# Patient Record
Sex: FEMALE | Race: WHITE | NOT HISPANIC OR LATINO | Employment: OTHER | URBAN - METROPOLITAN AREA
[De-identification: names, ages, dates, MRNs, and addresses within clinical notes are randomized per-mention and may not be internally consistent; named-entity substitution may affect disease eponyms.]

---

## 2023-09-08 PROBLEM — M22.42 PATELLOFEMORAL CHONDROSIS OF LEFT KNEE: Status: ACTIVE | Noted: 2023-09-08

## 2023-09-08 PROBLEM — R03.0 BORDERLINE BLOOD PRESSURE: Status: ACTIVE | Noted: 2023-09-08

## 2023-09-08 PROBLEM — M17.12 ARTHRITIS OF KNEE, LEFT: Status: ACTIVE | Noted: 2023-09-08

## 2023-09-08 PROBLEM — L98.9 SKIN LESION: Status: ACTIVE | Noted: 2023-09-08

## 2023-09-08 PROBLEM — G95.20 CERVICAL SPINAL CORD COMPRESSION (MULTI): Status: ACTIVE | Noted: 2023-09-08

## 2023-09-08 PROBLEM — G25.81 RESTLESS LEG SYNDROME: Status: ACTIVE | Noted: 2023-09-08

## 2023-09-08 PROBLEM — D64.9 ANEMIA: Status: ACTIVE | Noted: 2023-09-08

## 2023-09-08 PROBLEM — I78.1 SPIDER VEINS: Status: ACTIVE | Noted: 2023-09-08

## 2023-09-08 PROBLEM — E78.5 HYPERLIPIDEMIA: Status: ACTIVE | Noted: 2023-09-08

## 2023-09-08 PROBLEM — M47.812 CERVICAL SPONDYLOSIS WITHOUT MYELOPATHY: Status: ACTIVE | Noted: 2023-09-08

## 2023-09-08 PROBLEM — M54.12 CERVICAL RADICULOPATHY: Status: ACTIVE | Noted: 2023-09-08

## 2023-09-08 RX ORDER — ACETAMINOPHEN 500 MG
1 TABLET ORAL DAILY
COMMUNITY
End: 2024-02-09 | Stop reason: ALTCHOICE

## 2023-09-08 RX ORDER — CODEINE SULFATE 60 MG/1
TABLET ORAL
COMMUNITY
End: 2024-02-09 | Stop reason: ALTCHOICE

## 2023-09-08 RX ORDER — MULTIVIT-MIN/IRON/FOLIC ACID/K 18-600-40
1 CAPSULE ORAL
COMMUNITY
End: 2024-02-09 | Stop reason: ALTCHOICE

## 2023-09-08 RX ORDER — LORATADINE AND PSEUDOEPHEDRINE SULFATE 5; 120 MG/1; MG/1
1 TABLET, EXTENDED RELEASE ORAL 2 TIMES DAILY PRN
COMMUNITY

## 2023-09-08 RX ORDER — AMITRIPTYLINE HYDROCHLORIDE 10 MG/1
10 TABLET, FILM COATED ORAL
COMMUNITY
End: 2024-02-09 | Stop reason: ALTCHOICE

## 2023-09-08 RX ORDER — ASPIRIN 325 MG
TABLET, DELAYED RELEASE (ENTERIC COATED) ORAL
COMMUNITY
End: 2024-02-09 | Stop reason: ALTCHOICE

## 2023-09-08 RX ORDER — DOXYCYCLINE 100 MG/1
100 CAPSULE ORAL 2 TIMES DAILY
COMMUNITY
Start: 2022-10-19 | End: 2024-02-09 | Stop reason: ALTCHOICE

## 2024-02-07 RX ORDER — TIZANIDINE 4 MG/1
4 TABLET ORAL NIGHTLY PRN
COMMUNITY
Start: 2024-01-29

## 2024-02-07 RX ORDER — VERAPAMIL HYDROCHLORIDE 120 MG/1
120 TABLET, FILM COATED, EXTENDED RELEASE ORAL DAILY
COMMUNITY
Start: 2024-01-07

## 2024-02-09 ENCOUNTER — TELEPHONE (OUTPATIENT)
Dept: PRIMARY CARE | Facility: CLINIC | Age: 59
End: 2024-02-09

## 2024-02-09 ENCOUNTER — OFFICE VISIT (OUTPATIENT)
Dept: PRIMARY CARE | Facility: CLINIC | Age: 59
End: 2024-02-09
Payer: COMMERCIAL

## 2024-02-09 ENCOUNTER — LAB (OUTPATIENT)
Dept: LAB | Facility: LAB | Age: 59
End: 2024-02-09
Payer: COMMERCIAL

## 2024-02-09 VITALS
HEART RATE: 61 BPM | HEIGHT: 67 IN | BODY MASS INDEX: 26.37 KG/M2 | WEIGHT: 168 LBS | DIASTOLIC BLOOD PRESSURE: 84 MMHG | SYSTOLIC BLOOD PRESSURE: 140 MMHG | OXYGEN SATURATION: 98 % | TEMPERATURE: 98.1 F

## 2024-02-09 DIAGNOSIS — Z00.00 ENCOUNTER FOR GENERAL ADULT MEDICAL EXAMINATION WITHOUT ABNORMAL FINDINGS: Primary | ICD-10-CM

## 2024-02-09 DIAGNOSIS — Z00.00 ANNUAL PHYSICAL EXAM: Primary | ICD-10-CM

## 2024-02-09 DIAGNOSIS — M54.12 CERVICAL RADICULOPATHY: ICD-10-CM

## 2024-02-09 DIAGNOSIS — I83.93 VARICOSE VEINS OF BOTH LOWER EXTREMITIES, UNSPECIFIED WHETHER COMPLICATED: ICD-10-CM

## 2024-02-09 DIAGNOSIS — Z12.31 ENCOUNTER FOR SCREENING MAMMOGRAM FOR MALIGNANT NEOPLASM OF BREAST: ICD-10-CM

## 2024-02-09 DIAGNOSIS — E78.5 HYPERLIPIDEMIA, UNSPECIFIED: ICD-10-CM

## 2024-02-09 DIAGNOSIS — M53.3 SACROILIAC PAIN: ICD-10-CM

## 2024-02-09 DIAGNOSIS — E78.2 MIXED HYPERLIPIDEMIA: ICD-10-CM

## 2024-02-09 PROBLEM — M54.2 NECK PAIN: Status: RESOLVED | Noted: 2022-03-15 | Resolved: 2024-02-09

## 2024-02-09 PROBLEM — R51.9 HEADACHE: Status: RESOLVED | Noted: 2024-02-09 | Resolved: 2024-02-09

## 2024-02-09 PROBLEM — R50.9 FEVER: Status: RESOLVED | Noted: 2024-02-09 | Resolved: 2024-02-09

## 2024-02-09 PROBLEM — I78.1 SPIDER ANGIOMA: Status: RESOLVED | Noted: 2024-02-09 | Resolved: 2024-02-09

## 2024-02-09 PROBLEM — G25.81 RESTLESS LEGS SYNDROME: Status: RESOLVED | Noted: 2024-02-09 | Resolved: 2024-02-09

## 2024-02-09 LAB
ALBUMIN SERPL BCP-MCNC: 4.5 G/DL (ref 3.4–5)
ALP SERPL-CCNC: 74 U/L (ref 33–136)
ALT SERPL W P-5'-P-CCNC: 27 U/L (ref 7–45)
ANION GAP SERPL CALC-SCNC: 12 MMOL/L (ref 10–20)
AST SERPL W P-5'-P-CCNC: 25 U/L (ref 9–39)
BILIRUB SERPL-MCNC: 0.5 MG/DL (ref 0–1.2)
BUN SERPL-MCNC: 19 MG/DL (ref 6–23)
CALCIUM SERPL-MCNC: 10 MG/DL (ref 8.6–10.6)
CHLORIDE SERPL-SCNC: 104 MMOL/L (ref 98–107)
CHOLEST SERPL-MCNC: 203 MG/DL (ref 0–199)
CHOLESTEROL/HDL RATIO: 2.4
CO2 SERPL-SCNC: 33 MMOL/L (ref 21–32)
CREAT SERPL-MCNC: 0.86 MG/DL (ref 0.5–1.05)
EGFRCR SERPLBLD CKD-EPI 2021: 77 ML/MIN/1.73M*2
ERYTHROCYTE [DISTWIDTH] IN BLOOD BY AUTOMATED COUNT: 11.7 % (ref 11.5–14.5)
GLUCOSE SERPL-MCNC: 84 MG/DL (ref 74–99)
HCT VFR BLD AUTO: 42.3 % (ref 36–46)
HDLC SERPL-MCNC: 84.1 MG/DL
HGB BLD-MCNC: 14 G/DL (ref 12–16)
LDLC SERPL CALC-MCNC: 107 MG/DL
MCH RBC QN AUTO: 29 PG (ref 26–34)
MCHC RBC AUTO-ENTMCNC: 33.1 G/DL (ref 32–36)
MCV RBC AUTO: 88 FL (ref 80–100)
NON HDL CHOLESTEROL: 119 MG/DL (ref 0–149)
NRBC BLD-RTO: 0 /100 WBCS (ref 0–0)
PLATELET # BLD AUTO: 251 X10*3/UL (ref 150–450)
POTASSIUM SERPL-SCNC: 4.7 MMOL/L (ref 3.5–5.3)
PROT SERPL-MCNC: 7.1 G/DL (ref 6.4–8.2)
RBC # BLD AUTO: 4.83 X10*6/UL (ref 4–5.2)
SODIUM SERPL-SCNC: 144 MMOL/L (ref 136–145)
TRIGL SERPL-MCNC: 59 MG/DL (ref 0–149)
VLDL: 12 MG/DL (ref 0–40)
WBC # BLD AUTO: 5 X10*3/UL (ref 4.4–11.3)

## 2024-02-09 PROCEDURE — 99396 PREV VISIT EST AGE 40-64: CPT | Performed by: INTERNAL MEDICINE

## 2024-02-09 PROCEDURE — 80053 COMPREHEN METABOLIC PANEL: CPT

## 2024-02-09 PROCEDURE — 85027 COMPLETE CBC AUTOMATED: CPT

## 2024-02-09 PROCEDURE — 1036F TOBACCO NON-USER: CPT | Performed by: INTERNAL MEDICINE

## 2024-02-09 PROCEDURE — 36415 COLL VENOUS BLD VENIPUNCTURE: CPT

## 2024-02-09 PROCEDURE — 80061 LIPID PANEL: CPT

## 2024-02-09 RX ORDER — LANOLIN ALCOHOL/MO/W.PET/CERES
1000 CREAM (GRAM) TOPICAL DAILY
COMMUNITY

## 2024-02-09 RX ORDER — IBUPROFEN 100 MG/5ML
1000 SUSPENSION, ORAL (FINAL DOSE FORM) ORAL DAILY
COMMUNITY

## 2024-02-09 RX ORDER — BISMUTH SUBSALICYLATE 262 MG
1 TABLET,CHEWABLE ORAL DAILY
COMMUNITY

## 2024-02-09 ASSESSMENT — PAIN SCALES - GENERAL: PAINLEVEL: 0-NO PAIN

## 2024-02-09 ASSESSMENT — LIFESTYLE VARIABLES
HAS A RELATIVE, FRIEND, DOCTOR, OR ANOTHER HEALTH PROFESSIONAL EXPRESSED CONCERN ABOUT YOUR DRINKING OR SUGGESTED YOU CUT DOWN: NO
SKIP TO QUESTIONS 9-10: 1
HOW OFTEN DURING THE LAST YEAR HAVE YOU NEEDED AN ALCOHOLIC DRINK FIRST THING IN THE MORNING TO GET YOURSELF GOING AFTER A NIGHT OF HEAVY DRINKING: NEVER
HOW OFTEN DURING THE LAST YEAR HAVE YOU BEEN UNABLE TO REMEMBER WHAT HAPPENED THE NIGHT BEFORE BECAUSE YOU HAD BEEN DRINKING: NEVER
HOW OFTEN DURING THE LAST YEAR HAVE YOU FAILED TO DO WHAT WAS NORMALLY EXPECTED FROM YOU BECAUSE OF DRINKING: NEVER
HOW MANY STANDARD DRINKS CONTAINING ALCOHOL DO YOU HAVE ON A TYPICAL DAY: PATIENT DOES NOT DRINK
AUDIT TOTAL SCORE: 0
HOW OFTEN DURING THE LAST YEAR HAVE YOU HAD A FEELING OF GUILT OR REMORSE AFTER DRINKING: NEVER
HOW OFTEN DO YOU HAVE A DRINK CONTAINING ALCOHOL: NEVER
HAVE YOU OR SOMEONE ELSE BEEN INJURED AS A RESULT OF YOUR DRINKING: NO
HOW OFTEN DURING THE LAST YEAR HAVE YOU FOUND THAT YOU WERE NOT ABLE TO STOP DRINKING ONCE YOU HAD STARTED: NEVER
HOW OFTEN DO YOU HAVE SIX OR MORE DRINKS ON ONE OCCASION: NEVER
AUDIT-C TOTAL SCORE: 0

## 2024-02-09 ASSESSMENT — ENCOUNTER SYMPTOMS
OCCASIONAL FEELINGS OF UNSTEADINESS: 0
DEPRESSION: 0
LOSS OF SENSATION IN FEET: 0

## 2024-02-09 ASSESSMENT — PATIENT HEALTH QUESTIONNAIRE - PHQ9
SUM OF ALL RESPONSES TO PHQ9 QUESTIONS 1 AND 2: 0
2. FEELING DOWN, DEPRESSED OR HOPELESS: NOT AT ALL
1. LITTLE INTEREST OR PLEASURE IN DOING THINGS: NOT AT ALL

## 2024-02-09 NOTE — TELEPHONE ENCOUNTER
----- Message from Julius Branch MD sent at 2/9/2024  3:22 PM EST -----  Labs are stable except mild elevation of cholesterol.watch fat in diet

## 2024-02-09 NOTE — PROGRESS NOTES
[facility]: [facility]  PROGRESS NOTE      Yahaira Berrios is a 58 y.o. female that is being seen  today for Annual Exam.  Subjective   Pt. Is being seen for annual physical exam.Pt. has been doing well.Advance directives discussed with patient   Denies any hospitalisation or urgent care visit.  Previous Labs reviewed .  Pt. Is upto date on screening exams and vaccination  Denies any falls or hospitalisation.     Patient is a 58-year-old female who is being seen for annual physical examination.  Patient has hypertension and hyperlipidemia.  Patient also has history of varicose veins and sacroiliac pain.  Denies any significant complaints at present.  Patient is up-to-date on screening colonoscopy.  Patient will be due for screening mammogram.      ROS  Negative for fever or chills  Negative for sore throat, ear pain, nasal discharge  Negative for cough, shortness of breath or wheezing  Negative for chest pain, palpitations, swelling of legs  Negative for abdominal pain, constipation, diarrhea, blood in the stools  Negative for urinary complaints  Negative for headache, dizziness, weakness or numbness  Positive for sacroiliac pain  Negative for depression or anxiety  All other systems reviewed and were negative   Vitals:    02/09/24 1132   BP: 140/84   Pulse: 61   Temp: 36.7 °C (98.1 °F)   SpO2: 98%      Vitals:    02/09/24 1132   Weight: 76.2 kg (168 lb)     Body mass index is 26.31 kg/m².  Physical Exam  Constitutional: Patient does not appear to be in any acute distress  Head and Face: NCAT  Eyes: Normal external exam, EOMI  ENT: Normal external inspection of ears and nose. Oropharynx normal.  Cardiovascular: RRR, S1/S2, no murmurs, rubs, or gallops, radial pulses +2, no edema of extremities  Pulmonary: CTAB, no respiratory distress.  Abdomen: +BS, soft, non-tender, nondistended, no guarding or rebound, no masses noted  MSK: No joint swelling, normal movements of all extremities. Range of  "motion- normal.  Mild tenderness over the sacroiliac joint  Skin- No lesions, contusions, or erythema.  Varicose veins present bilaterally  Peripheral puslses palpable bilaterally 2+  Neuro: AAO X3, Cranial nerves 2-12 grossly intact,DTR 2+ in all 4 limbs   Psychiatric: Judgment intact. Appropriate mood and behavior    LABS   [unfilled]  Lab Results   Component Value Date    GLUCOSE 90 01/27/2023    CALCIUM 10.2 01/27/2023     01/27/2023    K 4.9 01/27/2023    CO2 26 01/27/2023     01/27/2023    BUN 23 01/27/2023    CREATININE 1.0 01/27/2023     Lab Results   Component Value Date    ALT 22 01/27/2023    AST 22 01/27/2023    ALKPHOS 92 01/27/2023    BILITOT 0.4 01/27/2023     Lab Results   Component Value Date    WBC 5.1 01/27/2023    HGB 14.5 01/27/2023    HCT 44.1 (H) 01/27/2023    MCV 87.0 01/27/2023     01/27/2023     Lab Results   Component Value Date    CHOL 208 (H) 01/27/2023    CHOL 214 (H) 07/23/2021     Lab Results   Component Value Date    HDL 81 01/27/2023    HDL 82 07/23/2021     Lab Results   Component Value Date    LDLCALC 108 01/27/2023    LDLCALC 107 07/23/2021     Lab Results   Component Value Date    TRIG 93 01/27/2023    TRIG 124 07/23/2021     No results found for: \"HGBA1C\"  Other labs not included in the list above were reviewed either before or during this encounter.    History    Past Medical History:   Diagnosis Date    Anemia     Borderline blood pressure     Fever 02/09/2024    Headache 02/09/2024    Hyperlipemia     Neck pain 03/15/2022    Personal history of other diseases of the musculoskeletal system and connective tissue     History of neck pain    Restless leg syndrome     Restless legs syndrome 02/09/2024    Skin lesion     Spider angioma 02/09/2024    Spider veins      Past Surgical History:   Procedure Laterality Date    OTHER SURGICAL HISTORY  04/04/2022    Tonsillectomy    OTHER SURGICAL HISTORY  04/04/2022    Appendectomy     Family History   Problem Relation " Name Age of Onset    Hyperlipidemia Father      Hypertension Father      Other (parkinsons) Father       Allergies   Allergen Reactions    Tramadol Anaphylaxis    Codeine Other     Upset stomach, vomiting    Sulfamethoxazole-Trimethoprim Rash     Current Outpatient Medications on File Prior to Visit   Medication Sig Dispense Refill    ascorbic acid (Vitamin C) 1,000 mg tablet Take 1 tablet (1,000 mg) by mouth once daily.      cyanocobalamin (Vitamin B-12) 1,000 mcg tablet Take 1 tablet (1,000 mcg) by mouth once daily.      loratadine-pseudoephedrine (Claritin-D 12 Hour) 5-120 mg 12 hr tablet Take 1 tablet by mouth 2 times a day as needed.      MAGNESIUM ORAL Take by mouth. 2 gels at night      multivitamin tablet Take 1 tablet by mouth once daily.      mv,jac,iron,mn/folic acid/chol (HAIR-SKIN-NAILS, PABA, ORAL) Take 2 Pieces of gum by mouth once daily.      tiZANidine (Zanaflex) 4 mg tablet Take 1 tablet (4 mg) by mouth as needed at bedtime for muscle spasms.      UNABLE TO FIND BEET ROOT; 8000 MG      verapamil SR (Calan-SR) 120 mg ER tablet Take 1 tablet (120 mg) by mouth once daily.      vitamin E acetate (VITAMIN E ORAL) Take 180 mg by mouth once daily.      [DISCONTINUED] amitriptyline (Elavil) 10 mg tablet Take 1 tablet (10 mg) by mouth.      [DISCONTINUED] ascorbic acid, vitamin C, 500 mg capsule Take 1 capsule by mouth.      [DISCONTINUED] biotin 300 mcg tablet Take 1 tablet (300 mcg) by mouth.      [DISCONTINUED] cholecalciferol (Vitamin D-3) 1,250 mcg (50,000 unit) capsule Take by mouth.      [DISCONTINUED] cholecalciferol (Vitamin D3) 5,000 Units tablet Take 1 capsule by mouth once daily.      [DISCONTINUED] codeine 60 mg tablet Take by mouth.      [DISCONTINUED] doxycycline (Monodox) 100 mg capsule Take 1 capsule (100 mg) by mouth 2 times a day.      [DISCONTINUED] fish oil/borage/flax/om3,6,9 1 (OMEGA 3-6-9 ORAL) Take by mouth.      [DISCONTINUED] MULTIVITAMINS WITH FLUORIDE ORAL Take by mouth.       [DISCONTINUED] vit B complex-C-folic ac-zinc 800 mcg- 12.5 mg tablet Take by mouth.       No current facility-administered medications on file prior to visit.     Immunization History   Administered Date(s) Administered    Flu vaccine (IIV4), preservative free *Check age/dose* 09/08/2016, 10/02/2017, 09/08/2021    Flu vaccine, quadrivalent, no egg protein, age 6 month or greater (FLUCELVAX) 09/22/2018, 09/08/2019, 08/24/2020, 09/09/2023    Flu vaccine, quadrivalent, recombinant, preservative free, adult (FLUBLOK) 09/04/2022    Influenza, injectable, quadrivalent 09/30/2016    Influenza, seasonal, injectable, preservative free 10/22/2015    Damián SARS-CoV-2 Vaccination 03/31/2021    Novel influenza-H1N1-09, preservative-free 01/02/2010    Pfizer Gray Cap SARS-CoV-2 04/02/2022    Pfizer Purple Cap SARS-CoV-2 10/28/2021    Tdap vaccine, age 7 year and older (BOOSTRIX, ADACEL) 01/31/2023    Tetanus toxoid, adsorbed 04/15/2009    Zoster vaccine, recombinant, adult (SHINGRIX) 08/16/2021, 01/28/2022     Patient's medical history was reviewed and updated either before or during this encounter.  ASSESSMENT / PLAN:  Diagnoses and all orders for this visit:  Annual physical exam  -     CBC; Future  -     Comprehensive Metabolic Panel; Future  Mixed hyperlipidemia  -     Lipid Panel; Future  Cervical radiculopathy  Encounter for screening mammogram for malignant neoplasm of breast  -     BI mammo bilateral screening tomosynthesis; Future  Varicose veins of both lower extremities, unspecified whether complicated  Sacroiliac pain    Is up-to-date on screening colonoscopy.  Patient is due for screening mammogram.  Patient is also due for blood work to be done.  Blood pressure is fairly controlled.  Patient is on verapamil and will continue with that.  Follow-up in 1 year      Julius Branch MD

## 2024-02-23 ENCOUNTER — HOSPITAL ENCOUNTER (OUTPATIENT)
Dept: RADIOLOGY | Facility: CLINIC | Age: 59
Discharge: HOME | End: 2024-02-23
Payer: COMMERCIAL

## 2024-02-23 ENCOUNTER — TELEPHONE (OUTPATIENT)
Dept: PRIMARY CARE | Facility: CLINIC | Age: 59
End: 2024-02-23
Payer: COMMERCIAL

## 2024-02-23 VITALS — BODY MASS INDEX: 26.68 KG/M2 | HEIGHT: 67 IN | WEIGHT: 170 LBS

## 2024-02-23 DIAGNOSIS — Z12.31 ENCOUNTER FOR SCREENING MAMMOGRAM FOR MALIGNANT NEOPLASM OF BREAST: ICD-10-CM

## 2024-02-23 PROBLEM — M17.12 ARTHRITIS OF LEFT KNEE: Status: RESOLVED | Noted: 2023-09-08 | Resolved: 2024-02-23

## 2024-02-23 PROBLEM — I83.93 VARICOSE VEINS OF BOTH LOWER EXTREMITIES: Status: RESOLVED | Noted: 2024-02-23 | Resolved: 2024-02-23

## 2024-02-23 PROBLEM — M22.2X9 DISORDER OF PATELLOFEMORAL JOINT: Status: RESOLVED | Noted: 2023-09-08 | Resolved: 2024-02-23

## 2024-02-23 PROBLEM — R03.0 PREHYPERTENSION: Status: RESOLVED | Noted: 2023-09-08 | Resolved: 2024-02-23

## 2024-02-23 PROBLEM — M47.812 SPONDYLOSIS OF CERVICAL SPINE WITHOUT MYELOPATHY: Status: RESOLVED | Noted: 2023-09-08 | Resolved: 2024-02-23

## 2024-02-23 PROCEDURE — 77067 SCR MAMMO BI INCL CAD: CPT

## 2024-06-04 ENCOUNTER — OFFICE VISIT (OUTPATIENT)
Dept: PRIMARY CARE | Facility: CLINIC | Age: 59
End: 2024-06-04
Payer: COMMERCIAL

## 2024-06-04 VITALS
HEIGHT: 67 IN | DIASTOLIC BLOOD PRESSURE: 76 MMHG | HEART RATE: 70 BPM | BODY MASS INDEX: 26.68 KG/M2 | WEIGHT: 170 LBS | TEMPERATURE: 94.2 F | SYSTOLIC BLOOD PRESSURE: 144 MMHG | OXYGEN SATURATION: 95 %

## 2024-06-04 DIAGNOSIS — Z71.89 ADVANCED CARE PLANNING/COUNSELING DISCUSSION: Primary | ICD-10-CM

## 2024-06-04 PROCEDURE — 99213 OFFICE O/P EST LOW 20 MIN: CPT | Performed by: INTERNAL MEDICINE

## 2024-06-04 ASSESSMENT — PATIENT HEALTH QUESTIONNAIRE - PHQ9
2. FEELING DOWN, DEPRESSED OR HOPELESS: NOT AT ALL
1. LITTLE INTEREST OR PLEASURE IN DOING THINGS: NOT AT ALL
SUM OF ALL RESPONSES TO PHQ9 QUESTIONS 1 AND 2: 0

## 2024-06-04 ASSESSMENT — PAIN SCALES - GENERAL: PAINLEVEL: 4

## 2024-06-10 NOTE — PROGRESS NOTES
"Subjective   Patient ID: Yahaira Berrios is a 58 y.o. female who presents for Follow-up (Discuss a DNR).    Patient is a 58-year-old female who is here to discuss DNR.  Patient does not want any resuscitation.  Patient also is going to have a living well and will make her daughter as her healthcare power of .  DNR form discussed with the patient.  It was filled in the office and scanned to the chart.         Review of Systems  Negative for any complaints  Objective   /76 (BP Location: Left arm)   Pulse 70   Temp 34.6 °C (94.2 °F)   Ht 1.702 m (5' 7\")   Wt 77.1 kg (170 lb)   SpO2 95%   BMI 26.63 kg/m²     Physical Exam  Normal physical exam  Assessment/Plan   Diagnoses and all orders for this visit:  Advanced care planning/counseling discussion    Advance care planning discussed with the patient.  DNR form discussed with the patient.  Patient is DNR CCA.  Patient does not want to have any resuscitation if her heart stops.  Form signed and scanned into the chart.     "

## 2024-11-07 ENCOUNTER — APPOINTMENT (OUTPATIENT)
Dept: NEUROLOGY | Facility: CLINIC | Age: 59
End: 2024-11-07
Payer: COMMERCIAL

## 2024-11-12 ENCOUNTER — OFFICE VISIT (OUTPATIENT)
Dept: NEUROLOGY | Facility: CLINIC | Age: 59
End: 2024-11-12
Payer: COMMERCIAL

## 2024-11-12 ENCOUNTER — PROCEDURE VISIT (OUTPATIENT)
Dept: NEUROLOGY | Facility: CLINIC | Age: 59
End: 2024-11-12
Payer: COMMERCIAL

## 2024-11-12 ENCOUNTER — APPOINTMENT (OUTPATIENT)
Dept: NEUROLOGY | Facility: CLINIC | Age: 59
End: 2024-11-12
Payer: COMMERCIAL

## 2024-11-12 VITALS
RESPIRATION RATE: 18 BRPM | BODY MASS INDEX: 26.68 KG/M2 | HEIGHT: 67 IN | TEMPERATURE: 97.3 F | DIASTOLIC BLOOD PRESSURE: 90 MMHG | HEART RATE: 74 BPM | WEIGHT: 170 LBS | SYSTOLIC BLOOD PRESSURE: 144 MMHG

## 2024-11-12 DIAGNOSIS — G56.22 NEUROPATHY, ULNAR AT ELBOW, LEFT: ICD-10-CM

## 2024-11-12 DIAGNOSIS — R29.898 HAND WEAKNESS: ICD-10-CM

## 2024-11-12 DIAGNOSIS — R29.898 HAND WEAKNESS: Primary | ICD-10-CM

## 2024-11-12 DIAGNOSIS — G56.12 LEFT MEDIAN NERVE NEUROPATHY: ICD-10-CM

## 2024-11-12 ASSESSMENT — PAIN SCALES - GENERAL: PAINLEVEL_OUTOF10: 0-NO PAIN

## 2024-11-12 ASSESSMENT — ENCOUNTER SYMPTOMS
LOSS OF SENSATION IN FEET: 0
DEPRESSION: 0
OCCASIONAL FEELINGS OF UNSTEADINESS: 0
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
## 2024-11-14 ENCOUNTER — APPOINTMENT (OUTPATIENT)
Dept: NEUROLOGY | Facility: CLINIC | Age: 59
End: 2024-11-14
Payer: COMMERCIAL

## 2024-11-14 PROBLEM — R29.898 HAND WEAKNESS: Status: ACTIVE | Noted: 2024-11-14

## 2024-11-14 PROBLEM — G56.22 NEUROPATHY, ULNAR AT ELBOW, LEFT: Status: ACTIVE | Noted: 2024-11-14

## 2024-11-14 PROBLEM — G56.12 LEFT MEDIAN NERVE NEUROPATHY: Status: ACTIVE | Noted: 2024-11-14

## 2024-11-18 ENCOUNTER — TELEPHONE (OUTPATIENT)
Dept: NEUROLOGY | Facility: CLINIC | Age: 59
End: 2024-11-18
Payer: COMMERCIAL

## 2025-01-30 ENCOUNTER — TELEPHONE (OUTPATIENT)
Dept: PRIMARY CARE | Facility: CLINIC | Age: 60
End: 2025-01-30
Payer: COMMERCIAL

## 2025-01-30 NOTE — TELEPHONE ENCOUNTER
LV 6/4/24, NV 2/14/25    Pt states she had a varicose vein in her left ankle bleed for 30 minutes after getting out of the shower yesterday.  Pt states it was like a balloon popped, and just gushed with blood everywhere.  Pt states she did not go to ED.  Pt states today it hasn't bled at all, just really sore.  I told pt to go to urgent care to be evaluated.

## 2025-02-10 ENCOUNTER — DOCUMENTATION (OUTPATIENT)
Dept: PRIMARY CARE | Facility: CLINIC | Age: 60
End: 2025-02-10
Payer: COMMERCIAL

## 2025-02-10 LAB
ALANINE AMINOTRANSFERASE (SGPT) (U/L) IN SER/PLAS EXTERNAL: 22 U/L
ALBUMIN (G/DL) IN SER/PLAS EXTERNAL: 4.9 G/DL
ALKALINE PHOSPHATASE (U/L) IN SER/PLAS EXTERNAL: 64 U/L
ASPARTATE AMINOTRANSFERASE (SGOT) (U/L) IN SER/PLAS EXTERNAL: 20 U/L
BILIRUBIN TOTAL (MG/DL) IN SER/PLAS EXTERNAL: 0.6 MG/DL
CALCIUM (MG/DL) IN SER/PLAS EXTERNAL: 9.9 MG/DL
CARBON DIOXIDE, TOTAL (MMOL/L) IN SER/PLAS EXTERNAL: 32 MMOL/L
CHLORIDE (MMOL/L) IN SER/PLAS EXTERNAL: 101 MMOL/L
CREATININE (MG/DL) IN SER/PLAS EXTERNAL: 0.84 MG/DL
ERYTHROCYTE DISTRIBUTION WIDTH (RATIO) BY AUTOMATED COUNT EXTERNAL: 12.3 %
ERYTHROCYTE MEAN CORPUSCULAR HEMOGLOBIN (PG) BY AUTOMATED COUNT EXTERNAL: 29.5 PG
ERYTHROCYTE MEAN CORPUSCULAR HGB CONCENTRATION (G/DL) BY AUTOMATED EXT: 33.5 G/DL
ERYTHROCYTE MEAN CORPUSCULAR VOLUME (FL) BY AUTOMATED COUNT EXTERNAL: 88 FL
ERYTHROCYTES (10*6/UL) IN BLOOD BY AUTOMATED COUNT EXTERNAL: 4.95 X10*6/UL
GLOMERULAR FILTRATION RATE ML/MIN/1.73 SQ M.PREDICTED EXTERNAL: 79 ML/MIN/1.73M*2
GLUCOSE (MG/DL) IN SER/PLAS EXTERNAL: 88 MG/DL
HEMATOCRIT (%) IN BLOOD BY AUTOMATED COUNT EXTERNAL: 43.6 %
HEMOGLOBIN (G/DL) IN BLOOD EXTERNAL: 14.6 G/DL
LEUKOCYTES (10*3/UL) IN BLOOD BY AUTOMATED COUNT EXTERNAL: 5.5 X10*3/UL
PLATELET MEAN VOLUME (FL) IN BLOOD BY AUTOMATED COUNT EXTERNAL: 258 FL
PLATELETS (10*3/UL) IN BLOOD AUTOMATED COUNT EXTERNAL: 12 X10*3/UL
POTASSIUM (MMOL/L) IN SER/PLAS EXTERMA;: 4.7 MMOL/L
PROTEIN TOTAL EXTERNAL: 7.2 G/DL
SODIUM (MMOL/L) IN SER/PLAS EXTERNAL: 141 MMOL/L
UREA NITROGEN (MG/DL) IN SER/PLAS EXTERNAL: 13 MG/DL

## 2025-02-11 PROBLEM — L98.9 SKIN LESION: Status: RESOLVED | Noted: 2023-09-08 | Resolved: 2025-02-11

## 2025-02-14 ENCOUNTER — OFFICE VISIT (OUTPATIENT)
Dept: PRIMARY CARE | Facility: CLINIC | Age: 60
End: 2025-02-14
Payer: COMMERCIAL

## 2025-02-14 VITALS
WEIGHT: 175 LBS | HEART RATE: 82 BPM | BODY MASS INDEX: 27.47 KG/M2 | DIASTOLIC BLOOD PRESSURE: 62 MMHG | TEMPERATURE: 96.8 F | HEIGHT: 67 IN | SYSTOLIC BLOOD PRESSURE: 148 MMHG | OXYGEN SATURATION: 96 %

## 2025-02-14 DIAGNOSIS — I83.93 VARICOSE VEINS OF BOTH LOWER EXTREMITIES, UNSPECIFIED WHETHER COMPLICATED: ICD-10-CM

## 2025-02-14 DIAGNOSIS — Z12.31 ENCOUNTER FOR SCREENING MAMMOGRAM FOR MALIGNANT NEOPLASM OF BREAST: ICD-10-CM

## 2025-02-14 DIAGNOSIS — E78.2 MIXED HYPERLIPIDEMIA: ICD-10-CM

## 2025-02-14 DIAGNOSIS — Z00.00 ANNUAL PHYSICAL EXAM: ICD-10-CM

## 2025-02-14 DIAGNOSIS — E55.9 VITAMIN D DEFICIENCY: ICD-10-CM

## 2025-02-14 PROCEDURE — 99396 PREV VISIT EST AGE 40-64: CPT | Performed by: INTERNAL MEDICINE

## 2025-02-14 PROCEDURE — 3008F BODY MASS INDEX DOCD: CPT | Performed by: INTERNAL MEDICINE

## 2025-02-14 ASSESSMENT — PATIENT HEALTH QUESTIONNAIRE - PHQ9
1. LITTLE INTEREST OR PLEASURE IN DOING THINGS: NOT AT ALL
SUM OF ALL RESPONSES TO PHQ9 QUESTIONS 1 AND 2: 0
2. FEELING DOWN, DEPRESSED OR HOPELESS: NOT AT ALL

## 2025-02-14 ASSESSMENT — ENCOUNTER SYMPTOMS
DEPRESSION: 0
OCCASIONAL FEELINGS OF UNSTEADINESS: 0
LOSS OF SENSATION IN FEET: 0

## 2025-02-14 ASSESSMENT — PAIN SCALES - GENERAL: PAINLEVEL_OUTOF10: 0-NO PAIN

## 2025-02-14 NOTE — PROGRESS NOTES
[facility]: [facility]  PROGRESS NOTE      Yahaira Berrios is a 59 y.o. female that is being seen  today for Annual Exam.  Subjective   Patient is a 59-year-old female with a history of borderline hyperlipidemia on diet control, varicose veins who is being seen for annual physical examination.  Since last visit patient had an episode where she had a bleeding from varicose veins over her ankle.  Patient went to the urgent care and had pressure bandage done.  Patient is willing to be seen by specialist for treatment of varicose veins.  Patient lab work has been stable.  Denies any significant complaints.  Present much active in her daily life.      ROS  Negative for fever or chills  Negative for sore throat, ear pain, nasal discharge  Negative for cough, shortness of breath or wheezing  Negative for chest pain, palpitations, swelling of legs  Negative for abdominal pain, constipation, diarrhea, blood in the stools  Negative for urinary complaints  Negative for headache, dizziness, weakness or numbness  Negative for joint pain  Negative for depression or anxiety  All other systems reviewed and were negative   Vitals:    02/14/25 1115   BP: 148/62   Pulse: 82   Temp: 36 °C (96.8 °F)   SpO2: 96%      Vitals:    02/14/25 1115   Weight: 79.4 kg (175 lb)     Body mass index is 27.41 kg/m².  Physical Exam  Constitutional: Patient does not appear to be in any acute distress  Head and Face: NCAT  Eyes: Normal external exam, EOMI  ENT: Normal external inspection of ears and nose. Oropharynx normal.  Cardiovascular: RRR, S1/S2, no murmurs, rubs, or gallops, radial pulses +2, positive for varicose veins over both legs.    Pulmonary: CTAB, no respiratory distress.  Abdomen: +BS, soft, non-tender, nondistended, no guarding or rebound, no masses noted  MSK: No joint swelling, normal movements of all extremities. Range of motion- normal.  Skin- No lesions, contusions, or erythema.  Peripheral puslses palpable  "bilaterally 2+  Neuro: AAO X3, Cranial nerves 2-12 grossly intact,DTR 2+ in all 4 limbs   Psychiatric: Judgment intact. Appropriate mood and behavior    LABS   [unfilled]  Lab Results   Component Value Date    GLUCOSE 88 02/08/2025    CALCIUM 9.9 02/08/2025     02/08/2025    K 4.7 02/08/2025    CO2 32 02/08/2025     02/08/2025    BUN 13 02/08/2025    CREATININE 0.84 02/08/2025     Lab Results   Component Value Date    ALT 22 02/08/2025    AST 20 02/08/2025    ALKPHOS 64 02/08/2025    BILITOT 0.6 02/08/2025     Lab Results   Component Value Date    WBC 5.5 02/08/2025    HGB 14.6 02/08/2025    HCT 43.6 02/08/2025    MCV 88 02/08/2025    PLT 12 02/08/2025     Lab Results   Component Value Date    CHOL 203 (H) 02/09/2024    CHOL 208 (H) 01/27/2023    CHOL 214 (H) 07/23/2021     Lab Results   Component Value Date    HDL 84.1 02/09/2024    HDL 81 01/27/2023    HDL 82 07/23/2021     Lab Results   Component Value Date    LDLCALC 107 (H) 02/09/2024    LDLCALC 108 01/27/2023    LDLCALC 107 07/23/2021     Lab Results   Component Value Date    TRIG 59 02/09/2024    TRIG 93 01/27/2023    TRIG 124 07/23/2021     No results found for: \"HGBA1C\"  Other labs not included in the list above were reviewed either before or during this encounter.    History    Past Medical History:   Diagnosis Date    Anemia     Arthritis of left knee 09/08/2023    Borderline blood pressure     Disorder of patellofemoral joint 09/08/2023    Fever 02/09/2024    Headache 02/09/2024    Hyperlipemia     Neck pain 03/15/2022    Personal history of other diseases of the musculoskeletal system and connective tissue     History of neck pain    Prehypertension 09/08/2023    Restless leg syndrome     Restless legs syndrome 02/09/2024    Skin lesion     Spider angioma 02/09/2024    Spider veins     Spondylosis of cervical spine without myelopathy 09/08/2023    Varicose veins of both lower extremities 02/23/2024     Past Surgical History:   Procedure " Laterality Date    OTHER SURGICAL HISTORY  04/04/2022    Tonsillectomy    OTHER SURGICAL HISTORY  04/04/2022    Appendectomy     Family History   Problem Relation Name Age of Onset    Hyperlipidemia Father Father     Hypertension Father Father     Other (parkinsons) Father Father     Parkinsonism Father Father      Allergies   Allergen Reactions    Tramadol Anaphylaxis    Codeine Other     Upset stomach, vomiting    Sulfamethoxazole-Trimethoprim Rash     Current Outpatient Medications on File Prior to Visit   Medication Sig Dispense Refill    ascorbic acid (Vitamin C) 1,000 mg tablet Take 1 tablet (1,000 mg) by mouth once daily.      cyanocobalamin (Vitamin B-12) 1,000 mcg tablet Take 1 tablet (1,000 mcg) by mouth once daily.      loratadine-pseudoephedrine (Claritin-D 12 Hour) 5-120 mg 12 hr tablet Take 1 tablet by mouth 2 times a day as needed.      MAGNESIUM ORAL Take by mouth. 2 gels at night      multivitamin tablet Take 1 tablet by mouth once daily.      mv,jac,iron,mn/folic acid/chol (HAIR-SKIN-NAILS, PABA, ORAL) Take 2 Pieces of gum by mouth once daily.      tiZANidine (Zanaflex) 4 mg tablet Take 1 tablet (4 mg) by mouth as needed at bedtime for muscle spasms.      UNABLE TO FIND BEET ROOT; 8000 MG      verapamil SR (Calan-SR) 120 mg ER tablet Take 1 tablet (120 mg) by mouth once daily.      vitamin E acetate (VITAMIN E ORAL) Take 180 mg by mouth once daily.       No current facility-administered medications on file prior to visit.     Immunization History   Administered Date(s) Administered    COVID-19, mRNA, LNP-S, PF, 30 mcg/0.3 mL dose 10/28/2021    Flu vaccine (IIV4), preservative free *Check age/dose* 09/08/2016, 10/02/2017, 09/08/2021    Flu vaccine, quadrivalent, no egg protein, age 6 month or greater (FLUCELVAX) 09/22/2018, 09/08/2019, 08/24/2020, 09/09/2023    Flu vaccine, quadrivalent, recombinant, preservative free, adult (FLUBLOK) 09/04/2022    Flu vaccine, trivalent, preservative free, age 6  months and greater (Fluarix/Fluzone/Flulaval) 10/22/2015    Flu vaccine, trivalent, preservative free, no egg protein, age 6 months or greater (Flucelvax) 10/26/2024    Influenza, injectable, quadrivalent 09/30/2016    Damián SARS-CoV-2 Vaccination 03/31/2021    Novel influenza-H1N1-09, preservative-free 01/02/2010    Pfizer Gray Cap SARS-CoV-2 04/02/2022    Tdap vaccine, age 7 year and older (BOOSTRIX, ADACEL) 01/31/2023    Tetanus toxoid, adsorbed 04/15/2009    Zoster vaccine, recombinant, adult (SHINGRIX) 08/16/2021, 01/28/2022     Patient's medical history was reviewed and updated either before or during this encounter.  ASSESSMENT / PLAN:  Diagnoses and all orders for this visit:  Encounter for screening mammogram for malignant neoplasm of breast  Annual physical exam  -     CBC; Future  -     Comprehensive Metabolic Panel; Future  Mixed hyperlipidemia  -     Lipid Panel; Future  Varicose veins of both lower extremities, unspecified whether complicated  -     Referral to Vascular Medicine; Future  Vitamin D deficiency  -     Vitamin D 25-Hydroxy,Total (for eval of Vitamin D levels); Future    Patient is being seen for annual physical examination.  Patient lab work reviewed and has been stable.  Patient has been given referral to see vascular medicine for varicose veins.      Julius Branch MD

## 2025-02-25 ENCOUNTER — HOSPITAL ENCOUNTER (OUTPATIENT)
Dept: RADIOLOGY | Facility: CLINIC | Age: 60
Discharge: HOME | End: 2025-02-25
Payer: COMMERCIAL

## 2025-02-25 VITALS — HEIGHT: 67 IN | BODY MASS INDEX: 27 KG/M2 | WEIGHT: 172 LBS

## 2025-02-25 DIAGNOSIS — Z12.31 ENCOUNTER FOR SCREENING MAMMOGRAM FOR MALIGNANT NEOPLASM OF BREAST: ICD-10-CM

## 2025-02-25 PROCEDURE — 77067 SCR MAMMO BI INCL CAD: CPT | Performed by: RADIOLOGY

## 2025-02-25 PROCEDURE — 77063 BREAST TOMOSYNTHESIS BI: CPT | Performed by: RADIOLOGY

## 2025-02-25 PROCEDURE — 77063 BREAST TOMOSYNTHESIS BI: CPT

## 2025-02-27 ENCOUNTER — TELEPHONE (OUTPATIENT)
Dept: PRIMARY CARE | Facility: CLINIC | Age: 60
End: 2025-02-27
Payer: COMMERCIAL

## 2025-03-12 ENCOUNTER — APPOINTMENT (OUTPATIENT)
Dept: VASCULAR SURGERY | Facility: CLINIC | Age: 60
End: 2025-03-12
Payer: COMMERCIAL

## 2025-03-12 VITALS
HEART RATE: 72 BPM | SYSTOLIC BLOOD PRESSURE: 148 MMHG | RESPIRATION RATE: 18 BRPM | DIASTOLIC BLOOD PRESSURE: 84 MMHG | BODY MASS INDEX: 27.06 KG/M2 | HEIGHT: 67 IN | WEIGHT: 172.4 LBS

## 2025-03-12 DIAGNOSIS — R58 HEMORRHAGE OF BLOOD VESSEL: ICD-10-CM

## 2025-03-12 DIAGNOSIS — I83.93 VARICOSE VEINS OF BOTH LOWER EXTREMITIES, UNSPECIFIED WHETHER COMPLICATED: ICD-10-CM

## 2025-03-12 DIAGNOSIS — I83.813 VARICOSE VEINS OF BILATERAL LOWER EXTREMITIES WITH PAIN: Primary | ICD-10-CM

## 2025-03-12 PROCEDURE — 99204 OFFICE O/P NEW MOD 45 MIN: CPT | Performed by: NURSE PRACTITIONER

## 2025-03-12 PROCEDURE — 3008F BODY MASS INDEX DOCD: CPT | Performed by: NURSE PRACTITIONER

## 2025-03-12 PROCEDURE — 1036F TOBACCO NON-USER: CPT | Performed by: NURSE PRACTITIONER

## 2025-03-12 ASSESSMENT — COLUMBIA-SUICIDE SEVERITY RATING SCALE - C-SSRS
1. IN THE PAST MONTH, HAVE YOU WISHED YOU WERE DEAD OR WISHED YOU COULD GO TO SLEEP AND NOT WAKE UP?: NO
6. HAVE YOU EVER DONE ANYTHING, STARTED TO DO ANYTHING, OR PREPARED TO DO ANYTHING TO END YOUR LIFE?: NO
2. HAVE YOU ACTUALLY HAD ANY THOUGHTS OF KILLING YOURSELF?: NO

## 2025-03-12 ASSESSMENT — PATIENT HEALTH QUESTIONNAIRE - PHQ9
2. FEELING DOWN, DEPRESSED OR HOPELESS: NOT AT ALL
SUM OF ALL RESPONSES TO PHQ9 QUESTIONS 1 AND 2: 0
1. LITTLE INTEREST OR PLEASURE IN DOING THINGS: NOT AT ALL

## 2025-03-12 ASSESSMENT — ENCOUNTER SYMPTOMS
ALLERGIC/IMMUNOLOGIC NEGATIVE: 1
EYES NEGATIVE: 1
MUSCULOSKELETAL NEGATIVE: 1
RESPIRATORY NEGATIVE: 1
PALPITATIONS: 0
SHORTNESS OF BREATH: 0
DEPRESSION: 0
LOSS OF SENSATION IN FEET: 0
GASTROINTESTINAL NEGATIVE: 1
ENDOCRINE NEGATIVE: 1
CONSTITUTIONAL NEGATIVE: 1
OCCASIONAL FEELINGS OF UNSTEADINESS: 0
NEUROLOGICAL NEGATIVE: 1
PSYCHIATRIC NEGATIVE: 1
ROS SKIN COMMENTS: BLE VARICOSE VEINS

## 2025-03-12 ASSESSMENT — PAIN SCALES - GENERAL: PAINLEVEL_OUTOF10: 0-NO PAIN

## 2025-03-12 NOTE — PROGRESS NOTES
NPV REASON: L medial ankle varicose vein with bleeding    CURRENT ENCOUNTER:  Yahaira Berrios is 59 y.o. female here for a bleeding varicose vein to the left leg which happened last month. She reports the bleeding was spurting and difficult to stop, she ended up going to a urgent care due to the bleeding L medial ankle varicose vein. She also reports heaviness to BLE L>R and aching to the legs. The left posterior varicose veins are painful to sit on although bother her as well with standing. She also notes some skin discoloration to BLE below the knees (L>R) to the ankles and shins.     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 She feels best when she wears her compression stockings day and night    Previous vein procedures: no  Previous phlebitis/DVT/PE: no  Previous venous ulcers: no  Family hx of varicose veins: yes, father  Using medical grade compression stockings: yes  Previous varicose veins bleeding: yes Feb 2024    RIGHT LEG C1 Telangiectasias or reticular veins, C2 Varicose Veins, and C4a Pigmentation or Eczema  RIGHT LEG PAIN 1, VARICOSE VEINS 2, SKIN PIGMENTATION 1, and USE OF COMPRESSION 3  RIGHT LEG CEAP: C4a  RIGHT LEG VCSS SCORE: 7    LEFT LEG C1 Telangiectasias or reticular veins, C2 Varicose Veins, C4a Pigmentation or Eczema, and C4c Corona Phlebectatica  LEFT LEG PAIN 2, VARICOSE VEINS 3, SKIN PIGMENTATION 1, and USE OF COMPRESSION 3  LEFT LEG CEAP: C4c  LEFT LEG VCSS SCORE: 9    PastMedHX:  Past Medical History:   Diagnosis Date    Anemia     Arthritis of left knee 09/08/2023    Borderline blood pressure     Disorder of patellofemoral joint 09/08/2023    Fever 02/09/2024    Headache 02/09/2024    Hyperlipemia     Neck pain 03/15/2022    Personal history of other diseases of the musculoskeletal system and connective tissue     History of neck pain    Prehypertension 09/08/2023    Restless leg syndrome     Restless legs  syndrome 2024    Skin lesion     Spider angioma 2024    Spider veins     Spondylosis of cervical spine without myelopathy 2023    Varicose veins of both lower extremities 2024     Past Surgical History:   Procedure Laterality Date    ANTERIOR CERVICAL DISCECTOMY W/ FUSION      CERVICAL FUSION      OTHER SURGICAL HISTORY  2022    Tonsillectomy    OTHER SURGICAL HISTORY  2022    Appendectomy     Family History   Problem Relation Name Age of Onset    Hyperlipidemia Father Father     Hypertension Father Father     Other (parkinsons) Father Father     Parkinsonism Father Father     Dementia Paternal Grandmother Shanelle Núñez      Social History     Socioeconomic History    Marital status:      Spouse name: Not on file    Number of children: Not on file    Years of education: Not on file    Highest education level: Not on file   Occupational History    Not on file   Tobacco Use    Smoking status: Former     Current packs/day: 0.00     Average packs/day: 2.0 packs/day for 10.0 years (20.0 ttl pk-yrs)     Types: Cigarettes     Start date: 1993     Quit date: 2003     Years since quittin.2     Passive exposure: Never    Smokeless tobacco: Never   Vaping Use    Vaping status: Never Used   Substance and Sexual Activity    Alcohol use: Yes     Alcohol/week: 2.0 standard drinks of alcohol     Types: 2 Standard drinks or equivalent per week    Drug use: Never    Sexual activity: Not Currently     Partners: Male     Birth control/protection: Abstinence   Other Topics Concern    Not on file   Social History Narrative    Not on file     Social Drivers of Health     Financial Resource Strain: Not on file   Food Insecurity: Not on file   Transportation Needs: Not on file   Physical Activity: Not on file   Stress: Not on file   Social Connections: Not on file   Intimate Partner Violence: Not on file   Housing Stability: Not on file       Meds:     Current Outpatient  Medications:     ascorbic acid (Vitamin C) 1,000 mg tablet, Take 1 tablet (1,000 mg) by mouth once daily., Disp: , Rfl:     cyanocobalamin (Vitamin B-12) 1,000 mcg tablet, Take 1 tablet (1,000 mcg) by mouth once daily., Disp: , Rfl:     loratadine-pseudoephedrine (Claritin-D 12 Hour) 5-120 mg 12 hr tablet, Take 1 tablet by mouth 2 times a day as needed., Disp: , Rfl:     MAGNESIUM ORAL, Take by mouth. 2 gels at night, Disp: , Rfl:     multivitamin tablet, Take 1 tablet by mouth once daily., Disp: , Rfl:     mv,jac,iron,mn/folic acid/chol (HAIR-SKIN-NAILS, PABA, ORAL), Take 2 Pieces of gum by mouth once daily., Disp: , Rfl:     tiZANidine (Zanaflex) 4 mg tablet, Take 1 tablet (4 mg) by mouth as needed at bedtime for muscle spasms., Disp: , Rfl:     UNABLE TO FIND, BEET ROOT; 8000 MG, Disp: , Rfl:     verapamil SR (Calan-SR) 120 mg ER tablet, Take 1 tablet (120 mg) by mouth once daily., Disp: , Rfl:     vitamin E acetate (VITAMIN E ORAL), Take 180 mg by mouth once daily., Disp: , Rfl:     Allergies:   Allergies   Allergen Reactions    Tramadol Anaphylaxis    Codeine Other     Upset stomach, vomiting    Divalproex Diarrhea    Sulfamethoxazole-Trimethoprim Rash       ROS:  Review of Systems   Constitutional: Negative.    HENT: Negative.     Eyes: Negative.    Respiratory: Negative.  Negative for shortness of breath.    Cardiovascular:  Negative for chest pain and palpitations.   Gastrointestinal: Negative.    Endocrine: Negative.    Genitourinary: Negative.    Musculoskeletal: Negative.    Skin:         BLE varicose veins   Allergic/Immunologic: Negative.    Neurological: Negative.    Psychiatric/Behavioral: Negative.          Objective:  Vitals:  Vitals:    03/12/25 0920   BP: 156/85   Pulse: 74   Resp: 18      Exam:  Physical Exam  Constitutional:       Appearance: Normal appearance.   HENT:      Head: Normocephalic and atraumatic.      Nose: Nose normal.      Mouth/Throat:      Mouth: Mucous membranes are moist.    Eyes:      Conjunctiva/sclera: Conjunctivae normal.   Cardiovascular:      Rate and Rhythm: Normal rate.      Pulses: Normal pulses.   Pulmonary:      Effort: Pulmonary effort is normal.   Musculoskeletal:         General: Normal range of motion.      Cervical back: Normal range of motion.   Skin:     General: Skin is warm and dry.      Capillary Refill: Capillary refill takes less than 2 seconds.      Comments: BLE varicose veins, L leg varicose veins extend from the posterior thigh to the medial ankle. Right leg scattered varicose veins. BLE scattered hyperpigmentation to the shin/ankles, corona to the left medial ankle   Neurological:      General: No focal deficit present.      Mental Status: She is alert and oriented to person, place, and time.   Psychiatric:         Mood and Affect: Mood normal.         Behavior: Behavior normal.         Thought Content: Thought content normal.         Judgment: Judgment normal.         Labs:  Lab Results   Component Value Date    WBC 5.5 02/08/2025    WBC 5.0 02/09/2024    WBC 5.1 01/27/2023    HGB 14.6 02/08/2025    HGB 14.0 02/09/2024    HGB 14.5 01/27/2023    HCT 43.6 02/08/2025    HCT 42.3 02/09/2024    HCT 44.1 (H) 01/27/2023    MCV 88 02/08/2025    MCV 88 02/09/2024    MCV 87.0 01/27/2023    PLT 12 02/08/2025     Lab Results   Component Value Date    CREATININE 0.84 02/08/2025    CREATININE 0.86 02/09/2024    CREATININE 1.0 01/27/2023    BUN 13 02/08/2025    BUN 19 02/09/2024    BUN 23 01/27/2023     02/08/2025     02/09/2024     01/27/2023    K 4.7 02/08/2025    K 4.7 02/09/2024    K 4.9 01/27/2023     02/08/2025     02/09/2024     01/27/2023    CO2 32 02/08/2025    CO2 33 (H) 02/09/2024    CO2 26 01/27/2023       Imaging:                            Assessment & Plan:  Yahaira Berrios is 59 y.o. female with a history of HTN and varicose veins who is presents with bleeding varicose vein to the left leg which happened last month. She  reports the bleeding was spurting and difficult to stop, she ended up going to a urgent care due to the bleeding L medial ankle varicose vein. She also reports heaviness to BLE L>R and aching to the legs. The left posterior varicose veins are painful to sit on although bother her as well with standing. She also notes some skin discoloration to BLE below the knees (L>R) to the ankles and shins.     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 She feels best when she wears her compression stockings day and night    3/12/25  RIGHT LEG CEAP: C4a  RIGHT LEG VCSS SCORE: 7  LEFT LEG CEAP: C4c  LEFT LEG VCSS SCORE: 9    It was a pleasure taking care of you today and appreciate your seeing us at our [facility] [place] [facility].     Today's plan is as follows:  1) Wear 20-30 mmHg medical grade compression stockings, on during the day. Off at night (or when sleeping)  2) Elevate your legs at rest  3) Keep you legs moisturized   4) If you develop bleeding to the varicose veins please apply pressure to the site of bleeding for at least 20 minutes while keeping you limp elevated above your heart. If the bleeding does not stop with manual pressure please present to the Emergency Department   5) Please follow up in after VI ultrasound is completed this can be a virtual video visit    Bonnie Vee DNP, APRN-CNP, AGPCNP-C, FNP-C, AGACNP-BC  Senior Nurse Practitioner, Vascular Surgery  [facility], [facility] [place]  [address] [facility], Office [phone]

## 2025-03-12 NOTE — PATIENT INSTRUCTIONS
It was a pleasure taking care of you today and appreciate your seeing us at our [facility] and [facility] [place] [facility].     Today's plan is as follows:  1) Wear 20-30 mmHg medical grade compression stockings, on during the day. Off at night (or when sleeping)  2) Elevate your legs at rest  3) Keep you legs moisturized   4) If you develop bleeding to the varicose veins please apply pressure to the site of bleeding for at least 20 minutes while keeping you limp elevated above your heart. If the bleeding does not stop with manual pressure please present to the Emergency Department   5) Please follow up in after VI ultrasound is completed this can be a virtual video visit    Please call the office with any questions at [phone].   You can speak to our secretaries or our clinical nurses for specific questions.   For Vein Center specific questions, you can also call [phone] or email at [email or web address]  If you need coordinating your appointments and testing you can do these at the  or by calling my office shortly after your visit.

## 2025-03-14 ENCOUNTER — HOSPITAL ENCOUNTER (OUTPATIENT)
Dept: VASCULAR MEDICINE | Facility: CLINIC | Age: 60
Discharge: HOME | End: 2025-03-14
Payer: COMMERCIAL

## 2025-03-14 DIAGNOSIS — I83.813 VARICOSE VEINS OF BILATERAL LOWER EXTREMITIES WITH PAIN: ICD-10-CM

## 2025-03-14 PROCEDURE — 93970 EXTREMITY STUDY: CPT

## 2025-03-14 PROCEDURE — 93970 EXTREMITY STUDY: CPT | Performed by: STUDENT IN AN ORGANIZED HEALTH CARE EDUCATION/TRAINING PROGRAM

## 2025-04-02 ENCOUNTER — TELEMEDICINE (OUTPATIENT)
Dept: VASCULAR SURGERY | Facility: CLINIC | Age: 60
End: 2025-04-02
Payer: COMMERCIAL

## 2025-04-02 DIAGNOSIS — R58 HEMORRHAGE OF BLOOD VESSEL: Primary | ICD-10-CM

## 2025-04-02 DIAGNOSIS — I83.93 VARICOSE VEINS OF BOTH LOWER EXTREMITIES, UNSPECIFIED WHETHER COMPLICATED: ICD-10-CM

## 2025-04-02 DIAGNOSIS — I83.813 VARICOSE VEINS OF BILATERAL LOWER EXTREMITIES WITH PAIN: ICD-10-CM

## 2025-04-02 PROCEDURE — 99214 OFFICE O/P EST MOD 30 MIN: CPT | Performed by: NURSE PRACTITIONER

## 2025-04-02 ASSESSMENT — ENCOUNTER SYMPTOMS
PALPITATIONS: 0
NEUROLOGICAL NEGATIVE: 1
CONSTITUTIONAL NEGATIVE: 1
ENDOCRINE NEGATIVE: 1
PSYCHIATRIC NEGATIVE: 1
MUSCULOSKELETAL NEGATIVE: 1
ALLERGIC/IMMUNOLOGIC NEGATIVE: 1
RESPIRATORY NEGATIVE: 1
EYES NEGATIVE: 1
GASTROINTESTINAL NEGATIVE: 1
SHORTNESS OF BREATH: 0

## 2025-04-02 NOTE — PATIENT INSTRUCTIONS
It was a pleasure taking care of you today and appreciate your seeing us at our [facility] and [facility] [place] [facility] - [facility]    Based on your leg symptoms, venous insufficiency studies and potential for clinical improvement, I am recommeding the followin) Will start with a left GSV Venaseal versus Varithena. At this visit we will assess your right leg  2) To follow at a second visit will be left posterior tributary Varithena  3) Please bring your compression socks to the day of the procedure.   4) We will proceed with insurance approval and call you once we can proceed with scheduling your procedures.    For Vein Center specific questions, particularly insurance related questions of booking your [place] intervention, you can call [phone] or email at [email or web address]    You can speak directly to my Vein Center Nurse Coordinator, Pinky Ha, for specific questions.

## 2025-04-02 NOTE — PROGRESS NOTES
F/U REASON: varicose veins    CURRENT ENCOUNTER:  Yahaira Berrios is 59 y.o. female here for follow up of  varicose veins. She denies any changes to the leg or veins since seen last. She reports wearing the compression stockings. Today she reports have a virus so is wearing a mask.    Obtained consent to continue with a video virtual visit      Meds:     Current Outpatient Medications:     ascorbic acid (Vitamin C) 1,000 mg tablet, Take 1 tablet (1,000 mg) by mouth once daily., Disp: , Rfl:     cyanocobalamin (Vitamin B-12) 1,000 mcg tablet, Take 1 tablet (1,000 mcg) by mouth once daily., Disp: , Rfl:     loratadine-pseudoephedrine (Claritin-D 12 Hour) 5-120 mg 12 hr tablet, Take 1 tablet by mouth 2 times a day as needed., Disp: , Rfl:     MAGNESIUM ORAL, Take by mouth. 2 gels at night, Disp: , Rfl:     multivitamin tablet, Take 1 tablet by mouth once daily., Disp: , Rfl:     mv,jac,iron,mn/folic acid/chol (HAIR-SKIN-NAILS, PABA, ORAL), Take 2 Pieces of gum by mouth once daily., Disp: , Rfl:     tiZANidine (Zanaflex) 4 mg tablet, Take 1 tablet (4 mg) by mouth as needed at bedtime for muscle spasms., Disp: , Rfl:     UNABLE TO FIND, BEET ROOT; 8000 MG, Disp: , Rfl:     verapamil SR (Calan-SR) 120 mg ER tablet, Take 1 tablet (120 mg) by mouth once daily., Disp: , Rfl:     vitamin E acetate (VITAMIN E ORAL), Take 180 mg by mouth once daily., Disp: , Rfl:     Allergies:   Allergies   Allergen Reactions    Tramadol Anaphylaxis    Codeine Other     Upset stomach, vomiting    Divalproex Diarrhea    Sulfamethoxazole-Trimethoprim Rash       ROS:  Review of Systems   Constitutional: Negative.    HENT: Negative.     Eyes: Negative.    Respiratory: Negative.  Negative for shortness of breath.    Cardiovascular:  Negative for chest pain and palpitations.   Gastrointestinal: Negative.    Endocrine: Negative.    Genitourinary: Negative.    Musculoskeletal: Negative.    Skin: Negative.    Allergic/Immunologic: Negative.     Neurological: Negative.    Psychiatric/Behavioral: Negative.       otherwise unremarkable    Physical Exam  Constitutional:       Appearance: Normal appearance.   Pulmonary:      Effort: Pulmonary effort is normal.   Musculoskeletal:      Cervical back: Normal range of motion.   Neurological:      Mental Status: She is alert and oriented to person, place, and time.   Psychiatric:         Mood and Affect: Mood normal.         Behavior: Behavior normal.         Thought Content: Thought content normal.         Judgment: Judgment normal.       Labs:  Lab Results   Component Value Date    WBC 5.5 02/08/2025    WBC 5.0 02/09/2024    WBC 5.1 01/27/2023    HGB 14.6 02/08/2025    HGB 14.0 02/09/2024    HGB 14.5 01/27/2023    HCT 43.6 02/08/2025    HCT 42.3 02/09/2024    HCT 44.1 (H) 01/27/2023    MCV 88 02/08/2025    MCV 88 02/09/2024    MCV 87.0 01/27/2023    PLT 12 02/08/2025     Lab Results   Component Value Date    CREATININE 0.84 02/08/2025    CREATININE 0.86 02/09/2024    CREATININE 1.0 01/27/2023    BUN 13 02/08/2025    BUN 19 02/09/2024    BUN 23 01/27/2023     02/08/2025     02/09/2024     01/27/2023    K 4.7 02/08/2025    K 4.7 02/09/2024    K 4.9 01/27/2023     02/08/2025     02/09/2024     01/27/2023    CO2 32 02/08/2025    CO2 33 (H) 02/09/2024    CO2 26 01/27/2023       Imaging:            Assessment & Plan:  Yahaira Berrios is 59 y.o. female with history of HTN and varicose veins who is presents with bleeding varicose vein to the left leg which happened last about a month ago. She reports the bleeding was spurting and difficult to stop, she ended up going to a urgent care due to the bleeding L medial ankle varicose vein. She also reports heaviness to BLE L>R and aching to the legs. The left posterior varicose veins are painful to sit on although bother her as well with standing. She also notes some skin discoloration to BLE below the knees (L>R) to the ankles and shins.       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 She feels best when she wears her compression stockings day and night     3/12/25  RIGHT LEG CEAP: C4a  RIGHT LEG VCSS SCORE: 7  LEFT LEG CEAP: C4c  LEFT LEG VCSS SCORE: 9    Based on your leg symptoms, venous insufficiency studies and potential for clinical improvement, I am recommeding the followin) Will start with a left GSV Venaseal versus Varithena. At this visit we will assess your right leg  2) To follow at a second visit will be left posterior tributary Varithena  3) Please bring your compression socks to the day of the procedure.   4) We will proceed with insurance approval and call you once we can proceed with scheduling your procedures.    I preformed this visit using realtime healthcare tools, including an audio/video connection between the patient at their home with her sister in [place] and myself in the office in [place], [place]     Pt plan and imaging dicussed with Dr. Olivia Vee, DNP, APRN-CNP, AGPCNP-C, FNP-C, AGACNP-BC  Senior Nurse Practitioner, Vascular Surgery  [facility], [facility] [place]  [address], Office [phone]

## 2025-05-29 ENCOUNTER — APPOINTMENT (OUTPATIENT)
Dept: CARDIOLOGY | Facility: CLINIC | Age: 60
End: 2025-05-29
Payer: COMMERCIAL

## 2025-07-01 ENCOUNTER — PROCEDURE VISIT (OUTPATIENT)
Dept: VASCULAR SURGERY | Facility: CLINIC | Age: 60
End: 2025-07-01
Payer: COMMERCIAL

## 2025-07-01 VITALS
BODY MASS INDEX: 27 KG/M2 | WEIGHT: 168 LBS | SYSTOLIC BLOOD PRESSURE: 144 MMHG | RESPIRATION RATE: 20 BRPM | HEART RATE: 71 BPM | DIASTOLIC BLOOD PRESSURE: 84 MMHG | HEIGHT: 66 IN

## 2025-07-01 DIAGNOSIS — I83.812 VARICOSE VEINS OF LEG WITH PAIN, LEFT: ICD-10-CM

## 2025-07-01 DIAGNOSIS — R58 HEMORRHAGE OF BLOOD VESSEL: Primary | ICD-10-CM

## 2025-07-01 PROCEDURE — 36465 NJX NONCMPND SCLRSNT 1 VEIN: CPT | Performed by: SURGERY

## 2025-07-01 ASSESSMENT — ENCOUNTER SYMPTOMS
LOSS OF SENSATION IN FEET: 0
DEPRESSION: 0
OCCASIONAL FEELINGS OF UNSTEADINESS: 0

## 2025-07-01 ASSESSMENT — PATIENT HEALTH QUESTIONNAIRE - PHQ9
SUM OF ALL RESPONSES TO PHQ9 QUESTIONS 1 AND 2: 0
1. LITTLE INTEREST OR PLEASURE IN DOING THINGS: NOT AT ALL
2. FEELING DOWN, DEPRESSED OR HOPELESS: NOT AT ALL

## 2025-07-01 ASSESSMENT — COLUMBIA-SUICIDE SEVERITY RATING SCALE - C-SSRS
1. IN THE PAST MONTH, HAVE YOU WISHED YOU WERE DEAD OR WISHED YOU COULD GO TO SLEEP AND NOT WAKE UP?: NO
2. HAVE YOU ACTUALLY HAD ANY THOUGHTS OF KILLING YOURSELF?: NO
6. HAVE YOU EVER DONE ANYTHING, STARTED TO DO ANYTHING, OR PREPARED TO DO ANYTHING TO END YOUR LIFE?: NO

## 2025-07-01 ASSESSMENT — PAIN SCALES - GENERAL: PAINLEVEL_OUTOF10: 5

## 2025-07-01 NOTE — PROGRESS NOTES
Patient was seen today for administration of VARITHENA to left leg at the level of  calf and ankle.     The patient was given an explanation of the protocol for administering Varithena risks and benefits were explained to the patient. After consent was obtained, the patient was positioned on the exam table in reverse trendelenberg and the left leg was prepped and draped (after tributaries and perforators were identified and marked). The veins requiring treatment were identified and measured/marked. The vein was cannulated using US guidance and venous access was confirmed. Once access was obtained, the patient was placed in steep trendelenberg for 3 minutes to drain the veins with a foam wedge was placed at foot for elevation of leg.     Aseptic technique was used along with the 's recommendation for product handling, a total of 10 cc was administered over a 2 access point with 2 administrations allowing the varithena to flow antegrade and retrograde with digit compression guidance.  While the drug was administered, we compressed the distal inflow vein and the perforators - the foam was observed as it travelled up the desired veins to be treated. During this time the ankle was also flexed to help compress the perforators.     ECHOSCLEROTHERAPY: YES  MAX VEIN SIZE TREATED: 3mm    Physical Exam  Musculoskeletal:        Legs:        After 2 minutes of compression, the ankle was then pumped for 30 pumps to help the foam travel cephalad. After treatment was completed, the access cannula/needle was removed and compression was held. We checked for any DVT or foam along the treated vein path and this was negative. Steri strip was applied to the access site(s) and then kerlix/ and compression pads/dressing was applied to the leg.     No reactions occurred during the administration nor while remaining in the clinic for about 10 minutes.   Wrap will stay on for 48 hours. patient will avoid heavy exercises for  7 days and wear compression stockings on the treated leg for 2 weeks, avoid inactivity and do daily walking.   Follow up will be in 7 days with a repeat duplex in our vascular lab.    STAFF: HARTH, MCDUFFIE, PO, GORCZYNSKI  START: 0938  STOP: 0957    Notable Findings: will assess result of today's session - has bulky vv around ankle and will plan for local ankle sclero at next session.               Ariel Baker MD, MHS, RPVI  , [employer]  Director, [facility], [facility] [place]  Co-Director, Vascular Laboratories, [facility] [place]  Division of Vascular Surgery and Endovascular Therapy  [facility]

## 2025-07-07 ENCOUNTER — TELEPHONE (OUTPATIENT)
Dept: VASCULAR SURGERY | Facility: CLINIC | Age: 60
End: 2025-07-07
Payer: COMMERCIAL

## 2025-07-08 ENCOUNTER — HOSPITAL ENCOUNTER (OUTPATIENT)
Dept: VASCULAR MEDICINE | Facility: CLINIC | Age: 60
Discharge: HOME | End: 2025-07-08
Payer: COMMERCIAL

## 2025-07-08 DIAGNOSIS — I83.813 VARICOSE VEINS OF BILATERAL LOWER EXTREMITIES WITH PAIN: ICD-10-CM

## 2025-07-08 PROCEDURE — 93971 EXTREMITY STUDY: CPT | Performed by: SURGERY

## 2025-07-08 PROCEDURE — 93971 EXTREMITY STUDY: CPT

## 2025-07-15 ENCOUNTER — PROCEDURE VISIT (OUTPATIENT)
Dept: VASCULAR SURGERY | Facility: CLINIC | Age: 60
End: 2025-07-15
Payer: COMMERCIAL

## 2025-07-15 VITALS
HEART RATE: 68 BPM | RESPIRATION RATE: 16 BRPM | SYSTOLIC BLOOD PRESSURE: 139 MMHG | BODY MASS INDEX: 27.12 KG/M2 | OXYGEN SATURATION: 95 % | WEIGHT: 168 LBS | DIASTOLIC BLOOD PRESSURE: 81 MMHG

## 2025-07-15 DIAGNOSIS — R58 HEMORRHAGE OF BLOOD VESSEL: ICD-10-CM

## 2025-07-15 DIAGNOSIS — I83.812 VARICOSE VEINS OF LEG WITH PAIN, LEFT: Primary | ICD-10-CM

## 2025-07-15 PROCEDURE — 36470 NJX SCLRSNT 1 INCMPTNT VEIN: CPT | Performed by: SURGERY

## 2025-07-15 ASSESSMENT — ENCOUNTER SYMPTOMS
OCCASIONAL FEELINGS OF UNSTEADINESS: 0
LOSS OF SENSATION IN FEET: 0
DEPRESSION: 0

## 2025-07-15 NOTE — PROGRESS NOTES
Patient presented for SCLEROTHERAPY  legs treated: left    prep agent: ETOH  locations treated: ANKLE/CALF  veins treated: TRIBUTARY/RETICULAR  sclerosant used: POLIDOCANOL 0.75 AND 0.5%  volume sclerosant 7cc 0.75% foam; 2cc 0.5% foam; 1cc 0.5% liquid  Echosclerotherapy: yes  TREATED VEIN DIAMETER: 3.4mm     Physical Exam  Musculoskeletal:        Legs:        at completion of procedure, treated areas were padded with gauze/tape and then compressive ace wraps were applied up to b/l thigh.      i was present for entire procedure, no untoward events noted.   provided patient with instruction for postop care and 1-2 week follow up to evaluate for the need for microthrombectomy.     NOTABLE FINDINGS (CLINICAL OR ULTRASOUND): GOOD decompression of ankle varices on this visit - previous treated veins were partially compressible - redid those with 0.75% and then did the medial foot reticular veins with combination foam and liquid          TREATMENT HISTORY:  7/1/2025: left GSV calf varithena      Ariel Baker MD, MHS, RPVI  , [employer]  Director, [facility], [facility] [place]  Co-Director, Vascular Laboratories, [facility] [place]  Division of Vascular Surgery and Endovascular Therapy  [facility]

## 2025-07-22 ENCOUNTER — HOSPITAL ENCOUNTER (OUTPATIENT)
Dept: VASCULAR MEDICINE | Facility: CLINIC | Age: 60
Discharge: HOME | End: 2025-07-22
Payer: COMMERCIAL

## 2025-07-22 DIAGNOSIS — I83.813 VARICOSE VEINS OF BILATERAL LOWER EXTREMITIES WITH PAIN: ICD-10-CM

## 2025-07-22 PROCEDURE — 93971 EXTREMITY STUDY: CPT | Performed by: SURGERY

## 2025-07-22 PROCEDURE — 93971 EXTREMITY STUDY: CPT

## 2025-07-31 ENCOUNTER — OFFICE VISIT (OUTPATIENT)
Dept: VASCULAR SURGERY | Facility: CLINIC | Age: 60
End: 2025-07-31
Payer: COMMERCIAL

## 2025-07-31 VITALS
HEART RATE: 70 BPM | HEIGHT: 67 IN | DIASTOLIC BLOOD PRESSURE: 86 MMHG | RESPIRATION RATE: 16 BRPM | SYSTOLIC BLOOD PRESSURE: 157 MMHG | WEIGHT: 169 LBS | BODY MASS INDEX: 26.53 KG/M2

## 2025-07-31 DIAGNOSIS — I83.812 VARICOSE VEINS OF LEG WITH PAIN, LEFT: Primary | ICD-10-CM

## 2025-07-31 PROCEDURE — 3008F BODY MASS INDEX DOCD: CPT | Performed by: SURGERY

## 2025-07-31 PROCEDURE — 99212 OFFICE O/P EST SF 10 MIN: CPT | Performed by: SURGERY

## 2025-07-31 ASSESSMENT — LIFESTYLE VARIABLES
HOW OFTEN DO YOU HAVE SIX OR MORE DRINKS ON ONE OCCASION: NEVER
HAVE YOU OR SOMEONE ELSE BEEN INJURED AS A RESULT OF YOUR DRINKING: NO
AUDIT TOTAL SCORE: 2
AUDIT-C TOTAL SCORE: 2
HOW OFTEN DO YOU HAVE A DRINK CONTAINING ALCOHOL: 2-4 TIMES A MONTH
HOW MANY STANDARD DRINKS CONTAINING ALCOHOL DO YOU HAVE ON A TYPICAL DAY: 1 OR 2
SKIP TO QUESTIONS 9-10: 1
HAS A RELATIVE, FRIEND, DOCTOR, OR ANOTHER HEALTH PROFESSIONAL EXPRESSED CONCERN ABOUT YOUR DRINKING OR SUGGESTED YOU CUT DOWN: NO

## 2025-07-31 ASSESSMENT — ENCOUNTER SYMPTOMS
LOSS OF SENSATION IN FEET: 0
OCCASIONAL FEELINGS OF UNSTEADINESS: 0
DEPRESSION: 0

## 2025-07-31 ASSESSMENT — PAIN SCALES - GENERAL: PAINLEVEL_OUTOF10: 5

## 2025-07-31 NOTE — PROGRESS NOTES
F/U REASON: post CVI therapy    CURRENT ENCOUNTER:  Yahaira Berrios is 59 y.o. female here for follow up of post CVI therapy.    The leg cramps are gone   Prior ankle pain is gone  Throbbing in the leg is also better    The foot feels big and swollen   But otherwise prior sx are gone  Walking and remaining active.   Overall pleased.     Meds:   Current Medications[1]    Allergies:   RX Allergies[2]    ROS:  Review of Systems  otherwise unremarkable    Objective:  Vitals:  Vitals:    07/31/25 1602   BP: 157/86   Pulse: 70   Resp: 16        Exam:  No distress  Breathing comfortably   Not tachycardic  Bilateral legs with intact pulses   Bilateral perfused feet  No leg edema            BEFORE:      Labs:  Lab Results   Component Value Date    WBC 5.5 02/08/2025    WBC 5.0 02/09/2024    WBC 5.1 01/27/2023    HGB 14.6 02/08/2025    HGB 14.0 02/09/2024    HGB 14.5 01/27/2023    HCT 43.6 02/08/2025    HCT 42.3 02/09/2024    HCT 44.1 (H) 01/27/2023    MCV 88 02/08/2025    MCV 88 02/09/2024    MCV 87.0 01/27/2023    PLT 12 02/08/2025     Lab Results   Component Value Date    CREATININE 0.84 02/08/2025    CREATININE 0.86 02/09/2024    CREATININE 1.0 01/27/2023    BUN 13 02/08/2025    BUN 19 02/09/2024    BUN 23 01/27/2023     02/08/2025     02/09/2024     01/27/2023    K 4.7 02/08/2025    K 4.7 02/09/2024    K 4.9 01/27/2023     02/08/2025     02/09/2024     01/27/2023    CO2 32 02/08/2025    CO2 33 (H) 02/09/2024    CO2 26 01/27/2023         Imaging:  none    Assessment & Plan:  Yahaira Berrios is 59 y.o. female with history of HTN and varicose veins who is presents with bleeding varicose vein to the left leg which happened last about a month ago. She reports the bleeding was spurting and difficult to stop, she ended up going to a urgent care due to the bleeding L medial ankle varicose vein. She also reports heaviness to BLE L>R and aching to the legs. The left posterior varicose veins are  painful to sit on although bother her as well with standing. She also notes some skin discoloration to BLE below the knees (L>R) to the ankles and shins.      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 She feels best when she wears her compression stockings day and night     3/12/25  RIGHT LEG CEAP: C4a  RIGHT LEG VCSS SCORE: 7  LEFT LEG CEAP: C4c  LEFT LEG VCSS SCORE: 9    7/1/2025: left leg (calf and foot) varithena  7/15/2025: Left ankle and lower medial calf sclerotherapy    1) ok to start excercises  2) can see me back within the year or earlier as needed  3) continue compression therapy as needed.     Ariel Baker MD, MHS, RPVI  , [employer]  Director, [facility], [facility] [place]  Co-Director, [facility], [facility] [place]  Division of Vascular Surgery and Endovascular Therapy  [facility]                 [1]   Current Outpatient Medications:     ascorbic acid (Vitamin C) 1,000 mg tablet, Take 1 tablet (1,000 mg) by mouth once daily., Disp: , Rfl:     cyanocobalamin (Vitamin B-12) 1,000 mcg tablet, Take 1 tablet (1,000 mcg) by mouth once daily., Disp: , Rfl:     loratadine-pseudoephedrine (Claritin-D 12 Hour) 5-120 mg 12 hr tablet, Take 1 tablet by mouth 2 times a day as needed., Disp: , Rfl:     MAGNESIUM ORAL, Take by mouth. 2 gels at night, Disp: , Rfl:     multivitamin tablet, Take 1 tablet by mouth once daily., Disp: , Rfl:     mv,jac,iron,mn/folic acid/chol (HAIR-SKIN-NAILS, PABA, ORAL), Take 2 Pieces of gum by mouth once daily., Disp: , Rfl:     tiZANidine (Zanaflex) 4 mg tablet, Take 1 tablet (4 mg) by mouth as needed at bedtime for muscle spasms., Disp: , Rfl:     UNABLE TO FIND, BEET ROOT; 8000 MG, Disp: , Rfl:     verapamil SR (Calan-SR) 120 mg ER  tablet, Take 1 tablet (120 mg) by mouth once daily., Disp: , Rfl:     vitamin E acetate (VITAMIN E ORAL), Take 180 mg by mouth once daily., Disp: , Rfl:   [2]   Allergies  Allergen Reactions    Tramadol Anaphylaxis    Codeine Other     Upset stomach, vomiting    Divalproex Diarrhea    Sulfamethoxazole-Trimethoprim Rash

## 2025-07-31 NOTE — PATIENT INSTRUCTIONS
It was a pleasure taking care of you today and appreciate your seeing us at our [facility].     Today's plan is as follows:  1) ok to start excercises  2) can see me back within the year or earlier as needed  3) continue compression therapy as needed.       Please call the office with any questions at [phone].   You can speak to our secretaries or our clinical nurses for specific questions.   For [facility] specific questions, you can also call [phone] or email at [email or web address]  If you need coordinating your appointments and testing you can do these at the  or by calling my office shortly after your visit.

## 2025-08-07 ENCOUNTER — APPOINTMENT (OUTPATIENT)
Dept: VASCULAR SURGERY | Facility: CLINIC | Age: 60
End: 2025-08-07
Payer: COMMERCIAL